# Patient Record
Sex: FEMALE | Race: WHITE | NOT HISPANIC OR LATINO | Employment: FULL TIME | URBAN - NONMETROPOLITAN AREA
[De-identification: names, ages, dates, MRNs, and addresses within clinical notes are randomized per-mention and may not be internally consistent; named-entity substitution may affect disease eponyms.]

---

## 2017-01-18 ENCOUNTER — OFFICE VISIT (OUTPATIENT)
Dept: ORTHOPEDIC SURGERY | Facility: CLINIC | Age: 42
End: 2017-01-18

## 2017-01-18 VITALS — BODY MASS INDEX: 30.92 KG/M2 | WEIGHT: 204 LBS | HEIGHT: 68 IN

## 2017-01-18 DIAGNOSIS — M70.61 TROCHANTERIC BURSITIS OF RIGHT HIP: ICD-10-CM

## 2017-01-18 DIAGNOSIS — M25.551 PAIN OF RIGHT HIP JOINT: Primary | ICD-10-CM

## 2017-01-18 PROCEDURE — 99203 OFFICE O/P NEW LOW 30 MIN: CPT | Performed by: NURSE PRACTITIONER

## 2017-01-18 PROCEDURE — 73502 X-RAY EXAM HIP UNI 2-3 VIEWS: CPT | Performed by: NURSE PRACTITIONER

## 2017-01-18 PROCEDURE — 20610 DRAIN/INJ JOINT/BURSA W/O US: CPT | Performed by: NURSE PRACTITIONER

## 2017-01-18 RX ORDER — LIDOCAINE HYDROCHLORIDE 20 MG/ML
2 INJECTION, SOLUTION INFILTRATION; PERINEURAL
Status: COMPLETED | OUTPATIENT
Start: 2017-01-18 | End: 2017-01-18

## 2017-01-18 RX ORDER — NORGESTIMATE AND ETHINYL ESTRADIOL 7DAYSX3 28
KIT ORAL
COMMUNITY
Start: 2015-09-28 | End: 2017-04-18

## 2017-01-18 RX ORDER — TRIAMCINOLONE ACETONIDE 40 MG/ML
40 INJECTION, SUSPENSION INTRA-ARTICULAR; INTRAMUSCULAR
Status: COMPLETED | OUTPATIENT
Start: 2017-01-18 | End: 2017-01-18

## 2017-01-18 RX ADMIN — LIDOCAINE HYDROCHLORIDE 2 ML: 20 INJECTION, SOLUTION INFILTRATION; PERINEURAL at 13:11

## 2017-01-18 RX ADMIN — TRIAMCINOLONE ACETONIDE 40 MG: 40 INJECTION, SUSPENSION INTRA-ARTICULAR; INTRAMUSCULAR at 13:11

## 2017-01-18 NOTE — PROGRESS NOTES
"Deisi Dorman is a 41 y.o. female   Primary provider:  Janette Johnson MD       Chief Complaint   Patient presents with   • Right Hip - Establish Care     Xray done today in office.        HISTORY OF PRESENT ILLNESS:    Hip Pain    There was no injury mechanism. The pain is present in the right hip. The quality of the pain is described as burning and aching. The pain is mild. The pain has been intermittent since onset. She reports no foreign bodies present. The symptoms are aggravated by palpation and weight bearing. She has tried non-weight bearing and rest for the symptoms. The treatment provided mild relief.        CONCURRENT MEDICAL HISTORY:    Past Medical History   Diagnosis Date   • Anemia        No Known Allergies      Current Outpatient Prescriptions:   •  norgestimate-ethinyl estradiol (TRI-LINYAH) 0.18/0.215/0.25 MG-35 MCG per tablet, TAKE 1 TABLET BY MOUTH EVERY DAY, Disp: , Rfl:     History reviewed. No pertinent past surgical history.    Family History   Problem Relation Age of Onset   • Hypertension Mother        Social History     Social History   • Marital status: Single     Spouse name: N/A   • Number of children: N/A   • Years of education: N/A     Occupational History   • Not on file.     Social History Main Topics   • Smoking status: Current Every Day Smoker     Packs/day: 1.00     Types: Cigarettes   • Smokeless tobacco: Not on file   • Alcohol use Yes      Comment: 1-13 per month   • Drug use: No   • Sexual activity: Not on file     Other Topics Concern   • Not on file     Social History Narrative   • No narrative on file        Review of Systems   Constitutional: Positive for fatigue.   Respiratory: Positive for cough.    Musculoskeletal: Positive for arthralgias.   Psychiatric/Behavioral: Positive for sleep disturbance.       PHYSICAL EXAMINATION:       Visit Vitals   • Ht 68\" (172.7 cm)   • Wt 204 lb (92.5 kg)   • BMI 31.02 kg/m2       Physical Exam   Constitutional: She is oriented " to person, place, and time. Vital signs are normal. She appears well-developed and well-nourished. She is cooperative.   HENT:   Head: Normocephalic and atraumatic.   Neck: Trachea normal and phonation normal.   Pulmonary/Chest: Effort normal. No respiratory distress.   Abdominal: Soft. Normal appearance. She exhibits no distension.   Neurological: She is alert and oriented to person, place, and time. GCS eye subscore is 4. GCS verbal subscore is 5. GCS motor subscore is 6.   Skin: Skin is warm, dry and intact.   Psychiatric: She has a normal mood and affect. Her speech is normal and behavior is normal. Judgment and thought content normal. Cognition and memory are normal.   Vitals reviewed.      GAIT:     []  Normal  []  Antalgic    Assistive device: []  None  []  Walker     []  Crutches  []  Cane     []  Wheelchair  []  Stretcher    Right Shoulder Exam     Tenderness   The patient is experiencing no tenderness.        Range of Motion   Active Abduction: normal   Passive Abduction: normal   Extension: normal   Forward Flexion: normal   External Rotation: normal   Internal Rotation 0 degrees: normal   Internal Rotation 90 degrees: normal     Other   Erythema: absent  Scars: absent  Sensation: normal  Pulse: present      Left Shoulder Exam   Left shoulder exam is normal.            Large Joint Arthrocentesis  Date/Time: 1/18/2017 1:11 PM  Consent given by: patient  Site marked: site marked  Timeout: Immediately prior to procedure a time out was called to verify the correct patient, procedure, equipment, support staff and site/side marked as required   Supporting Documentation  Indications: pain   Procedure Details  Location: hip - R greater trochanteric bursa  Preparation: Patient was prepped and draped in the usual sterile fashion  Needle size: 22 G  Approach: lateral  Medications administered: 40 mg triamcinolone acetonide 40 MG/ML; 2 mL lidocaine 2%  Patient tolerance: patient tolerated the procedure well with no  immediate complications                Xr Hip With Or Without Pelvis 2 - 3 View Right    Result Date: 1/18/2017  Impression: Standing AP of the pelvis with views of the right hip reveal no acute bony abnormalities 01/18/17 at 2:07 PM by SAMSON Wyatt           ASSESSMENT:    Diagnoses and all orders for this visit:    Pain of right hip joint  -     XR Hip With or Without Pelvis 2 - 3 View Right    Trochanteric bursitis of right hip  -     Large Joint Arthrocentesis    Other orders  -     norgestimate-ethinyl estradiol (TRI-LINYAH) 0.18/0.215/0.25 MG-35 MCG per tablet; TAKE 1 TABLET BY MOUTH EVERY DAY          PLAN  Injected the trochanteric bursa today and recommended progression of activity as tolerated based on pain, home exercises and follow-up in 4-6 weeks if pain continues.  No Follow-up on file.    SAMSON Wyatt

## 2017-01-18 NOTE — MR AVS SNAPSHOT
"                        Deisiainsley Dorman   1/18/2017 8:20 AM   Office Visit    Dept Phone:  [phone]   Encounter #:  [id number]    Provider:  SAMSON Lutz   Department:  [facility]                Your Full Care Plan              Your Updated Medication List      Notice  As of 1/18/2017  1:31 PM    You have not been prescribed any medications.            We Performed the Following     Large Joint Arthrocentesis     XR Hip With or Without Pelvis 2 - 3 View Right       You Were Diagnosed With        Codes Comments    Pain of right hip joint    -  Primary ICD-10-CM: M25.551  ICD-9-CM: 719.45     Trochanteric bursitis of right hip     ICD-10-CM: M70.61  ICD-9-CM: 726.5       Instructions     None    Patient Instructions History      Upcoming Appointments     Visit Type Date Time Department    NEW PROBLEM 1/18/2017  8:20 AM [facility]      MyChart Signup     Our records indicate that you have declined [facility] signup. If you would like to sign up for [facility] [employer], please email [email or web address] or call [phone] to obtain an activation code.             Other Info from Your Visit           Allergies     No Known Allergies      Reason for Visit     Right Hip - Establish Care Xray done today in office.       Vital Signs     Height Weight Body Mass Index Smoking Status          68\" (172.7 cm) 204 lb (92.5 kg) 31.02 kg/m2 Current Every Day Smoker        Problems and Diagnoses Noted     Anemia    Pain in joint of right hip    -  Primary    Bursitis of hip          Results     XR Hip With or Without Pelvis 2 - 3 View Right               "

## 2017-04-18 ENCOUNTER — OFFICE VISIT (OUTPATIENT)
Dept: ORTHOPEDIC SURGERY | Facility: CLINIC | Age: 42
End: 2017-04-18

## 2017-04-18 VITALS — BODY MASS INDEX: 29.86 KG/M2 | WEIGHT: 197 LBS | HEIGHT: 68 IN

## 2017-04-18 DIAGNOSIS — M25.551 PAIN OF RIGHT HIP JOINT: Primary | ICD-10-CM

## 2017-04-18 DIAGNOSIS — M70.61 TROCHANTERIC BURSITIS OF RIGHT HIP: ICD-10-CM

## 2017-04-18 PROCEDURE — 99213 OFFICE O/P EST LOW 20 MIN: CPT | Performed by: NURSE PRACTITIONER

## 2017-04-18 PROCEDURE — 20610 DRAIN/INJ JOINT/BURSA W/O US: CPT | Performed by: NURSE PRACTITIONER

## 2017-04-18 RX ADMIN — TRIAMCINOLONE ACETONIDE 80 MG: 40 INJECTION, SUSPENSION INTRA-ARTICULAR; INTRAMUSCULAR at 15:42

## 2017-04-18 RX ADMIN — LIDOCAINE HYDROCHLORIDE 2 ML: 10 INJECTION, SOLUTION INFILTRATION; PERINEURAL at 15:42

## 2017-04-18 NOTE — PROGRESS NOTES
"Deisi Dorman is a 42 y.o. female returns for     Chief Complaint   Patient presents with   • Right Hip - Follow-up       HISTORY OF PRESENT ILLNESS: Patient is requesting a steroid injection into the hip today.  Last injection was   1/18/17. That injection improved her pain greatly.     CONCURRENT MEDICAL HISTORY:    Past Medical History:   Diagnosis Date   • Anemia        No Known Allergies      Current Outpatient Prescriptions:   •  loratadine (CLARITIN) 10 MG tablet, Take 1 tablet by mouth Daily., Disp: 30 tablet, Rfl: 0    No past surgical history on file.    ROS  No fevers or chills.  No chest pain or shortness of air.  No GI or  disturbances.    PHYSICAL EXAMINATION:       Ht 68\" (172.7 cm)  Wt 197 lb (89.4 kg)  BMI 29.95 kg/m2    Physical Exam   Constitutional: She is oriented to person, place, and time. Vital signs are normal. She appears well-developed and well-nourished. She is cooperative.   HENT:   Head: Normocephalic and atraumatic.   Neck: Trachea normal and phonation normal.   Pulmonary/Chest: Effort normal. No respiratory distress.   Abdominal: Soft. Normal appearance. She exhibits no distension.   Neurological: She is alert and oriented to person, place, and time. GCS eye subscore is 4. GCS verbal subscore is 5. GCS motor subscore is 6.   Skin: Skin is warm, dry and intact.   Psychiatric: She has a normal mood and affect. Her speech is normal and behavior is normal. Judgment and thought content normal. Cognition and memory are normal.   Vitals reviewed.      GAIT:     [x]  Normal  []  Antalgic    Assistive device: [x]  None  []  Walker     []  Crutches  []  Cane     []  Wheelchair  []  Stretcher    Right Hip Exam     Tenderness   The patient is experiencing tenderness in the greater trochanter.    Range of Motion   Extension: normal   Flexion: normal   Internal Rotation: normal   External Rotation: normal   Abduction: normal   Adduction: normal     Muscle Strength   Abduction: 5/5 "   Adduction: 5/5   Flexion: 5/5     Other   Erythema: absent  Scars: absent  Sensation: normal  Pulse: present      Left Hip Exam   Left hip exam is normal.              No results found.          ASSESSMENT:    Diagnoses and all orders for this visit:    Pain of right hip joint  -     Large Joint Arthrocentesis    Trochanteric bursitis of right hip  -     Large Joint Arthrocentesis        Large Joint Arthrocentesis  Date/Time: 4/18/2017 3:42 PM  Consent given by: patient  Site marked: site marked  Timeout: Immediately prior to procedure a time out was called to verify the correct patient, procedure, equipment, support staff and site/side marked as required   Supporting Documentation  Indications: pain   Procedure Details  Location: hip - R greater trochanteric bursa  Preparation: Patient was prepped and draped in the usual sterile fashion  Needle size: 22 G  Approach: lateral  Medications administered: 80 mg triamcinolone acetonide 40 MG/ML; 2 mL lidocaine 1 %  Patient tolerance: patient tolerated the procedure well with no immediate complications          PLAN  Repeated the bursal injection today and recommended progression of HEP and activity as tolerated based on pain   No Follow-up on file.    Warren Milner, APRN

## 2017-04-23 RX ORDER — TRIAMCINOLONE ACETONIDE 40 MG/ML
80 INJECTION, SUSPENSION INTRA-ARTICULAR; INTRAMUSCULAR
Status: COMPLETED | OUTPATIENT
Start: 2017-04-18 | End: 2017-04-18

## 2017-04-23 RX ORDER — LIDOCAINE HYDROCHLORIDE 10 MG/ML
2 INJECTION, SOLUTION INFILTRATION; PERINEURAL
Status: COMPLETED | OUTPATIENT
Start: 2017-04-18 | End: 2017-04-18

## 2017-08-10 ENCOUNTER — OFFICE VISIT (OUTPATIENT)
Dept: OPHTHALMOLOGY | Facility: CLINIC | Age: 42
End: 2017-08-10

## 2017-08-10 DIAGNOSIS — B30.9 ACUTE VIRAL CONJUNCTIVITIS OF BOTH EYES: Primary | ICD-10-CM

## 2017-08-10 PROCEDURE — 99202 OFFICE O/P NEW SF 15 MIN: CPT | Performed by: OPHTHALMOLOGY

## 2017-08-10 NOTE — PROGRESS NOTES
Subjective   Deisi Dorman is a 42 y.o. female.   Chief Complaint   Patient presents with   • Itchy Eye     HPI     Itchy Eye   Laterality: left eye   Frequency: constantly   Associated symptoms: redness and discharge   Treatments tried: oral antihistamines   Response to treatment: no improvement           Comments   Red, irritation, am matting OU past week, no URI sxs, exposure to niece with pink eye, getting better       Last edited by Luis E Troy MD on 8/10/2017  4:18 PM. (History)          Review of Systems   Eyes: Positive for discharge and redness.       Objective   Base Eye Exam     Visual Acuity (Snellen - Linear)      Right Left   Dist sc 20/30 -1 20/40               Slit Lamp and Fundus Exam     External Exam      Right Left    External Normal Normal      Slit Lamp Exam      Right Left    Lids/Lashes Normal Normal    Conjunctiva/Sclera 1+ Injection, 2+ Follicles 1+ Injection, 2+ Follicles    Cornea Clear Clear    Anterior Chamber Deep and quiet Deep and quiet    Iris Round and reactive Round and reactive    Lens Clear Clear    Vitreous Normal Normal                Assessment/Plan   Diagnoses and all orders for this visit:    Acute viral conjunctivitis of both eyes      Plan:   HWP, art tears, f/u if not improved

## 2017-09-12 ENCOUNTER — OFFICE VISIT (OUTPATIENT)
Dept: ORTHOPEDIC SURGERY | Facility: CLINIC | Age: 42
End: 2017-09-12

## 2017-09-12 VITALS — HEIGHT: 68 IN | WEIGHT: 201 LBS | BODY MASS INDEX: 30.46 KG/M2

## 2017-09-12 DIAGNOSIS — M70.61 TROCHANTERIC BURSITIS OF RIGHT HIP: ICD-10-CM

## 2017-09-12 DIAGNOSIS — M25.551 PAIN OF RIGHT HIP JOINT: Primary | ICD-10-CM

## 2017-09-12 PROCEDURE — 20610 DRAIN/INJ JOINT/BURSA W/O US: CPT | Performed by: NURSE PRACTITIONER

## 2017-09-12 PROCEDURE — 99213 OFFICE O/P EST LOW 20 MIN: CPT | Performed by: NURSE PRACTITIONER

## 2017-09-12 RX ADMIN — LIDOCAINE HYDROCHLORIDE 2 ML: 10 INJECTION, SOLUTION INFILTRATION; PERINEURAL at 15:16

## 2017-09-12 RX ADMIN — TRIAMCINOLONE ACETONIDE 80 MG: 40 INJECTION, SUSPENSION INTRA-ARTICULAR; INTRAMUSCULAR at 15:16

## 2017-09-12 NOTE — PROGRESS NOTES
"Deisi Dorman is a 42 y.o. female returns for     Chief Complaint   Patient presents with   • Right Hip - Follow-up       HISTORY OF PRESENT ILLNESS: patient requesting steroid injection, last injection done on 4/18/2017       CONCURRENT MEDICAL HISTORY:    Past Medical History:   Diagnosis Date   • Anemia        No Known Allergies      Current Outpatient Prescriptions:   •  loratadine (CLARITIN) 10 MG tablet, Take 1 tablet by mouth Daily., Disp: 30 tablet, Rfl: 0    No past surgical history on file.    ROS  No fevers or chills.  No chest pain or shortness of air.  No GI or  disturbances.    PHYSICAL EXAMINATION:       Ht 68\" (172.7 cm)  Wt 201 lb (91.2 kg)  BMI 30.56 kg/m2    Physical Exam   Constitutional: She is oriented to person, place, and time. Vital signs are normal. She appears well-developed and well-nourished. She is cooperative.   HENT:   Head: Normocephalic and atraumatic.   Neck: Trachea normal and phonation normal.   Pulmonary/Chest: Effort normal. No respiratory distress.   Abdominal: Soft. Normal appearance. She exhibits no distension.   Neurological: She is alert and oriented to person, place, and time. GCS eye subscore is 4. GCS verbal subscore is 5. GCS motor subscore is 6.   Skin: Skin is warm, dry and intact.   Psychiatric: She has a normal mood and affect. Her speech is normal and behavior is normal. Judgment and thought content normal. Cognition and memory are normal.   Vitals reviewed.      GAIT:     [x]  Normal  []  Antalgic    Assistive device: [x]  None  []  Walker     []  Crutches  []  Cane     []  Wheelchair  []  Stretcher    Right Hip Exam     Tenderness   The patient is experiencing tenderness in the greater trochanter.    Range of Motion   Extension: normal   Flexion: normal   Internal Rotation: normal   External Rotation: normal   Abduction: normal   Adduction: normal     Muscle Strength   Abduction: 5/5   Adduction: 5/5   Flexion: 5/5     Other   Erythema: absent  Scars: " absent  Sensation: normal  Pulse: present      Left Hip Exam   Left hip exam is normal.                Large Joint Arthrocentesis  Date/Time: 9/12/2017 3:16 PM  Consent given by: patient  Site marked: site marked  Timeout: Immediately prior to procedure a time out was called to verify the correct patient, procedure, equipment, support staff and site/side marked as required   Supporting Documentation  Indications: pain   Procedure Details  Location: hip - R greater trochanteric bursa  Preparation: Patient was prepped and draped in the usual sterile fashion  Needle size: 22 G  Approach: lateral  Medications administered: 80 mg triamcinolone acetonide 40 MG/ML; 2 mL lidocaine 1 %  Patient tolerance: patient tolerated the procedure well with no immediate complications                ASSESSMENT:    Diagnoses and all orders for this visit:    Pain of right hip joint  -     Large Joint Arthrocentesis    Trochanteric bursitis of right hip  -     Large Joint Arthrocentesis          PLAN  Repeated the trochanteric bursa injection today and recommended progression range of motion and activity as tolerated and follow-up as needed.  No Follow-up on file.    Warren Milner, APRN

## 2017-09-13 RX ORDER — TRIAMCINOLONE ACETONIDE 40 MG/ML
80 INJECTION, SUSPENSION INTRA-ARTICULAR; INTRAMUSCULAR
Status: COMPLETED | OUTPATIENT
Start: 2017-09-12 | End: 2017-09-12

## 2017-09-13 RX ORDER — LIDOCAINE HYDROCHLORIDE 10 MG/ML
2 INJECTION, SOLUTION INFILTRATION; PERINEURAL
Status: COMPLETED | OUTPATIENT
Start: 2017-09-12 | End: 2017-09-12

## 2017-11-08 ENCOUNTER — OFFICE VISIT (OUTPATIENT)
Dept: FAMILY MEDICINE CLINIC | Facility: CLINIC | Age: 42
End: 2017-11-08

## 2017-11-08 VITALS
TEMPERATURE: 98 F | HEART RATE: 70 BPM | BODY MASS INDEX: 30.23 KG/M2 | HEIGHT: 68 IN | OXYGEN SATURATION: 99 % | WEIGHT: 199.5 LBS | DIASTOLIC BLOOD PRESSURE: 84 MMHG | RESPIRATION RATE: 20 BRPM | SYSTOLIC BLOOD PRESSURE: 120 MMHG

## 2017-11-08 DIAGNOSIS — Z87.42 HISTORY OF ABNORMAL CERVICAL PAP SMEAR: ICD-10-CM

## 2017-11-08 DIAGNOSIS — R53.83 FATIGUE, UNSPECIFIED TYPE: ICD-10-CM

## 2017-11-08 DIAGNOSIS — Z13.220 SCREENING FOR HYPERLIPIDEMIA: ICD-10-CM

## 2017-11-08 DIAGNOSIS — Z12.39 SCREENING FOR BREAST CANCER: ICD-10-CM

## 2017-11-08 DIAGNOSIS — M54.50 ACUTE LEFT-SIDED LOW BACK PAIN WITHOUT SCIATICA: Primary | ICD-10-CM

## 2017-11-08 PROCEDURE — 99213 OFFICE O/P EST LOW 20 MIN: CPT | Performed by: NURSE PRACTITIONER

## 2017-11-08 RX ORDER — CYCLOBENZAPRINE HCL 10 MG
10 TABLET ORAL 3 TIMES DAILY PRN
Qty: 30 TABLET | Refills: 0 | Status: SHIPPED | OUTPATIENT
Start: 2017-11-08 | End: 2017-11-15

## 2017-11-08 NOTE — PROGRESS NOTES
Subjective   Deisi CONKLIN is a 42 y.o. female.  Here to establish primary care. Having left lowe back pain.  Onset this past Monday after twisting and lifting an overnight bag that was full.  Pain is now increasing while sitting but does ease while standing.      Back Pain   This is a new problem. The current episode started in the past 7 days. The problem occurs constantly. The pain is present in the lumbar spine. The quality of the pain is described as stabbing. The pain radiates to the left thigh. The pain is at a severity of 7/10. The pain is moderate. The pain is the same all the time. The symptoms are aggravated by standing. Risk factors include lack of exercise. She has tried NSAIDs for the symptoms. The treatment provided mild relief.        The following portions of the patient's history were reviewed and updated as appropriate: allergies, current medications, past family history, past medical history, past social history, past surgical history and problem list.    Review of Systems   Constitutional: Negative.    Eyes: Negative.    Respiratory: Negative.    Cardiovascular: Negative.    Gastrointestinal: Negative.    Genitourinary: Negative.    Musculoskeletal: Positive for back pain.   Skin: Negative.        Objective   Physical Exam   Constitutional: She is oriented to person, place, and time. She appears well-developed and well-nourished.   HENT:   Head: Normocephalic.   Right Ear: External ear normal.   Left Ear: External ear normal.   Mouth/Throat: Oropharynx is clear and moist.   Eyes: EOM are normal. Pupils are equal, round, and reactive to light.   Neck: Normal range of motion. Neck supple.   Cardiovascular: Normal rate, regular rhythm and normal heart sounds.    Pulmonary/Chest: Effort normal and breath sounds normal.   Abdominal: Soft. Bowel sounds are normal.   Musculoskeletal: Normal range of motion.   Neurological: She is alert and oriented to person, place, and time.   Skin: Skin is warm  and dry.   Psychiatric: She has a normal mood and affect. Her behavior is normal. Judgment and thought content normal.   Nursing note and vitals reviewed.      Assessment/Plan   Problems Addressed this Visit     None      Visit Diagnoses     Acute left-sided low back pain without sciatica    -  Primary    Relevant Medications    cyclobenzaprine (FLEXERIL) 10 MG tablet    diclofenac (VOLTAREN) 50 MG EC tablet    Screening for breast cancer        Relevant Orders    Mammo Screening Digital Tomosynthesis Bilateral With CAD    CBC & Differential    Comprehensive metabolic panel    TSH    Lipid panel    Screening for hyperlipidemia        Fatigue, unspecified type        History of abnormal cervical Pap smear        Relevant Orders    Ambulatory Referral to Obstetrics / Gynecology        Begin Voltaren and Flexeril as prescribed lumbar pain and cautioned on the sedating effects of Flexeril  No heavy lifting greater than 5 pounds    Begin OTC Aspercreme with Lidocaine for lumbar pain as directed  Radiology will call to schedule mammogram for breast cancer screening  Gynecology will enriqueta to schedule appointment for annual exam with pap smear  Complete fasting lab work and will call with results         This document has been electronically signed by SAMSON Chopra on November 8, 2017 5:19 PM

## 2017-11-09 ENCOUNTER — DOCUMENTATION (OUTPATIENT)
Dept: FAMILY MEDICINE CLINIC | Facility: CLINIC | Age: 42
End: 2017-11-09

## 2017-11-15 ENCOUNTER — OFFICE VISIT (OUTPATIENT)
Dept: FAMILY MEDICINE CLINIC | Facility: CLINIC | Age: 42
End: 2017-11-15

## 2017-11-15 VITALS
HEIGHT: 68 IN | DIASTOLIC BLOOD PRESSURE: 80 MMHG | BODY MASS INDEX: 30.02 KG/M2 | HEART RATE: 79 BPM | RESPIRATION RATE: 20 BRPM | WEIGHT: 198.1 LBS | TEMPERATURE: 98.1 F | SYSTOLIC BLOOD PRESSURE: 118 MMHG | OXYGEN SATURATION: 98 %

## 2017-11-15 DIAGNOSIS — R05.9 COUGH: ICD-10-CM

## 2017-11-15 DIAGNOSIS — R06.2 WHEEZING: Primary | ICD-10-CM

## 2017-11-15 DIAGNOSIS — J21.9 ACUTE BRONCHIOLITIS DUE TO UNSPECIFIED ORGANISM: ICD-10-CM

## 2017-11-15 PROCEDURE — 96372 THER/PROPH/DIAG INJ SC/IM: CPT | Performed by: NURSE PRACTITIONER

## 2017-11-15 PROCEDURE — 99213 OFFICE O/P EST LOW 20 MIN: CPT | Performed by: NURSE PRACTITIONER

## 2017-11-15 RX ORDER — METHYLPREDNISOLONE 4 MG/1
TABLET ORAL
Qty: 1 EACH | Refills: 0 | Status: SHIPPED | OUTPATIENT
Start: 2017-11-15 | End: 2018-01-23

## 2017-11-15 RX ORDER — ALBUTEROL SULFATE 90 UG/1
2 AEROSOL, METERED RESPIRATORY (INHALATION) EVERY 4 HOURS PRN
Qty: 18 G | Refills: 11 | Status: SHIPPED | OUTPATIENT
Start: 2017-11-15 | End: 2021-05-11

## 2017-11-15 RX ORDER — BENZONATATE 200 MG/1
200 CAPSULE ORAL 3 TIMES DAILY PRN
Qty: 60 CAPSULE | Refills: 0 | Status: SHIPPED | OUTPATIENT
Start: 2017-11-15 | End: 2018-01-23

## 2017-11-15 RX ORDER — TRIAMCINOLONE ACETONIDE 40 MG/ML
60 INJECTION, SUSPENSION INTRA-ARTICULAR; INTRAMUSCULAR ONCE
Status: COMPLETED | OUTPATIENT
Start: 2017-11-15 | End: 2017-11-15

## 2017-11-15 RX ADMIN — TRIAMCINOLONE ACETONIDE 60 MG: 40 INJECTION, SUSPENSION INTRA-ARTICULAR; INTRAMUSCULAR at 11:28

## 2017-11-15 NOTE — PROGRESS NOTES
Subjective   Deisi CONKLIN is a 42 y.o. female.  Began having cough, sore throat, nasal congestion and sinus pressure.  Onset of symptoms this past Saturday (11/11/2017).  Cough is waking her up at night.  No fever or chills.  Has some cough syrup at home that has Phenergan in it and has been using at night with some relief.    Cough   This is a new problem. The current episode started in the past 7 days. The problem has been gradually worsening. The problem occurs every few minutes. The cough is productive of sputum. Associated symptoms include ear congestion, nasal congestion, postnasal drip and rhinorrhea. The symptoms are aggravated by cold air. Risk factors for lung disease include smoking/tobacco exposure. She has tried prescription cough suppressant for the symptoms. The treatment provided no relief. Her past medical history is significant for bronchitis.        The following portions of the patient's history were reviewed and updated as appropriate: allergies, current medications, past family history, past medical history, past social history, past surgical history and problem list.    Review of Systems   HENT: Positive for congestion, postnasal drip, rhinorrhea, sinus pressure and sneezing.    Eyes: Negative.    Respiratory: Positive for cough.    Cardiovascular: Negative.    Gastrointestinal: Negative.    Genitourinary: Negative.        Objective   Physical Exam   Constitutional: She is oriented to person, place, and time. She appears well-nourished.   HENT:   Head: Normocephalic.   Right Ear: External ear normal. Tympanic membrane is bulging.   Left Ear: External ear normal. Tympanic membrane is bulging.   Nose: Right sinus exhibits frontal sinus tenderness. Left sinus exhibits frontal sinus tenderness.   Mouth/Throat: Mucous membranes are normal. Posterior oropharyngeal erythema present.   Moderate amount of clear drainage noted to posterior pharynx   Eyes: EOM are normal. Pupils are equal, round,  and reactive to light.   Neck: Normal range of motion. Neck supple.   Cardiovascular: Normal rate, regular rhythm and normal heart sounds.    Pulmonary/Chest: Effort normal. She has wheezes in the right upper field, the right lower field, the left upper field and the left lower field. She has rhonchi in the right upper field, the right lower field, the left upper field and the left lower field.   Musculoskeletal: Normal range of motion.   Neurological: She is alert and oriented to person, place, and time.   Skin: Skin is warm and dry.   Psychiatric: She has a normal mood and affect. Her behavior is normal. Judgment and thought content normal.   Nursing note and vitals reviewed.      Assessment/Plan   Problems Addressed this Visit     None      Visit Diagnoses     Wheezing    -  Primary    Relevant Medications    albuterol (PROVENTIL HFA;VENTOLIN HFA) 108 (90 Base) MCG/ACT inhaler    triamcinolone acetonide (KENALOG-40) injection 60 mg (Completed) (Start on 11/15/2017 12:00 PM)    MethylPREDNISolone (MEDROL, RITA,) 4 MG tablet    Cough        Relevant Medications    MethylPREDNISolone (MEDROL, RITA,) 4 MG tablet    benzonatate (TESSALON) 200 MG capsule    Acute bronchiolitis due to unspecified organism        Relevant Medications    albuterol (PROVENTIL HFA;VENTOLIN HFA) 108 (90 Base) MCG/ACT inhaler    MethylPREDNISolone (MEDROL, RITA,) 4 MG tablet    benzonatate (TESSALON) 200 MG capsule        Acute Bronchitis with Wheezing:  Begin prescription for albuterol inhaler 1-2 puffs every 4 hours when necessary for shortness of air wheezing  Kenalog 60 mg IM given in office  Begin Medrol Dosepak as prescribed  Schedule follow-up appointment in 48-72 hours if no improvement or worsening of symptoms    Cough:  Begin prescription for Tessalon Perles to be taken 3 times a day when necessary for cough  Increase  fluids to help thin secretions  Recommended to take over-the-counter Muccinex according package directions          This document has been electronically signed by SAMSON Chopra on November 15, 2017 11:29 AM

## 2017-12-14 ENCOUNTER — LAB (OUTPATIENT)
Dept: LAB | Facility: HOSPITAL | Age: 42
End: 2017-12-14

## 2017-12-14 ENCOUNTER — TELEPHONE (OUTPATIENT)
Dept: FAMILY MEDICINE CLINIC | Facility: CLINIC | Age: 42
End: 2017-12-14

## 2017-12-14 DIAGNOSIS — Z12.39 SCREENING FOR BREAST CANCER: ICD-10-CM

## 2017-12-14 LAB
ALBUMIN SERPL-MCNC: 3.8 G/DL (ref 3.4–4.8)
ALBUMIN/GLOB SERPL: 1.4 G/DL (ref 1.1–1.8)
ALP SERPL-CCNC: 49 U/L (ref 38–126)
ALT SERPL W P-5'-P-CCNC: 23 U/L (ref 9–52)
ANION GAP SERPL CALCULATED.3IONS-SCNC: 7 MMOL/L (ref 5–15)
ARTICHOKE IGE QN: 76 MG/DL (ref 1–129)
AST SERPL-CCNC: 31 U/L (ref 14–36)
BASOPHILS # BLD AUTO: 0.02 10*3/MM3 (ref 0–0.2)
BASOPHILS NFR BLD AUTO: 0.2 % (ref 0–2)
BILIRUB SERPL-MCNC: 0.7 MG/DL (ref 0.2–1.3)
BUN BLD-MCNC: 17 MG/DL (ref 7–21)
BUN/CREAT SERPL: 21 (ref 7–25)
CALCIUM SPEC-SCNC: 9.4 MG/DL (ref 8.4–10.2)
CHLORIDE SERPL-SCNC: 102 MMOL/L (ref 95–110)
CHOLEST SERPL-MCNC: 153 MG/DL (ref 0–199)
CO2 SERPL-SCNC: 28 MMOL/L (ref 22–31)
CREAT BLD-MCNC: 0.81 MG/DL (ref 0.5–1)
DEPRECATED RDW RBC AUTO: 45.5 FL (ref 36.4–46.3)
EOSINOPHIL # BLD AUTO: 0.14 10*3/MM3 (ref 0–0.7)
EOSINOPHIL NFR BLD AUTO: 1.4 % (ref 0–7)
ERYTHROCYTE [DISTWIDTH] IN BLOOD BY AUTOMATED COUNT: 12.4 % (ref 11.5–14.5)
GFR SERPL CREATININE-BSD FRML MDRD: 77 ML/MIN/1.73
GLOBULIN UR ELPH-MCNC: 2.8 GM/DL (ref 2.3–3.5)
GLUCOSE BLD-MCNC: 81 MG/DL (ref 60–100)
HCT VFR BLD AUTO: 40.9 % (ref 35–45)
HDLC SERPL-MCNC: 61 MG/DL (ref 60–200)
HGB BLD-MCNC: 14 G/DL (ref 12–15.5)
IMM GRANULOCYTES # BLD: 0.04 10*3/MM3 (ref 0–0.02)
IMM GRANULOCYTES NFR BLD: 0.4 % (ref 0–0.5)
LDLC/HDLC SERPL: 1.3 {RATIO} (ref 0–3.22)
LYMPHOCYTES # BLD AUTO: 2.66 10*3/MM3 (ref 0.6–4.2)
LYMPHOCYTES NFR BLD AUTO: 26.9 % (ref 10–50)
MCH RBC QN AUTO: 34.1 PG (ref 26.5–34)
MCHC RBC AUTO-ENTMCNC: 34.2 G/DL (ref 31.4–36)
MCV RBC AUTO: 99.5 FL (ref 80–98)
MONOCYTES # BLD AUTO: 0.67 10*3/MM3 (ref 0–0.9)
MONOCYTES NFR BLD AUTO: 6.8 % (ref 0–12)
NEUTROPHILS # BLD AUTO: 6.36 10*3/MM3 (ref 2–8.6)
NEUTROPHILS NFR BLD AUTO: 64.3 % (ref 37–80)
PLATELET # BLD AUTO: 219 10*3/MM3 (ref 150–450)
PMV BLD AUTO: 10.4 FL (ref 8–12)
POTASSIUM BLD-SCNC: 4.3 MMOL/L (ref 3.5–5.1)
PROT SERPL-MCNC: 6.6 G/DL (ref 6.3–8.6)
RBC # BLD AUTO: 4.11 10*6/MM3 (ref 3.77–5.16)
SODIUM BLD-SCNC: 137 MMOL/L (ref 137–145)
TRIGL SERPL-MCNC: 62 MG/DL (ref 20–199)
TSH SERPL DL<=0.05 MIU/L-ACNC: 1.86 MIU/ML (ref 0.46–4.68)
WBC NRBC COR # BLD: 9.89 10*3/MM3 (ref 3.2–9.8)

## 2017-12-14 PROCEDURE — 80061 LIPID PANEL: CPT

## 2017-12-14 PROCEDURE — 85025 COMPLETE CBC W/AUTO DIFF WBC: CPT

## 2017-12-14 PROCEDURE — 84443 ASSAY THYROID STIM HORMONE: CPT

## 2017-12-14 PROCEDURE — 36415 COLL VENOUS BLD VENIPUNCTURE: CPT

## 2017-12-14 PROCEDURE — 80053 COMPREHEN METABOLIC PANEL: CPT

## 2017-12-14 NOTE — TELEPHONE ENCOUNTER
Per SAMSON Sanders Kimberly Smith was called regarding recent labs. A message was left to return call to our office.

## 2017-12-14 NOTE — TELEPHONE ENCOUNTER
Deisi Cox returned call regarding recent lab results. Lab results given.  Continue current meds and follow up as planned or sooner if any problems.

## 2018-01-17 ENCOUNTER — OFFICE VISIT (OUTPATIENT)
Dept: OBSTETRICS AND GYNECOLOGY | Facility: CLINIC | Age: 43
End: 2018-01-17

## 2018-01-17 VITALS
BODY MASS INDEX: 30.62 KG/M2 | HEIGHT: 68 IN | DIASTOLIC BLOOD PRESSURE: 89 MMHG | WEIGHT: 202 LBS | HEART RATE: 87 BPM | SYSTOLIC BLOOD PRESSURE: 116 MMHG

## 2018-01-17 DIAGNOSIS — Z01.419 ENCOUNTER FOR GYNECOLOGICAL EXAMINATION WITHOUT ABNORMAL FINDING: Primary | ICD-10-CM

## 2018-01-17 DIAGNOSIS — Z30.011 ENCOUNTER FOR INITIAL PRESCRIPTION OF CONTRACEPTIVE PILLS: ICD-10-CM

## 2018-01-17 DIAGNOSIS — Z12.31 ENCOUNTER FOR SCREENING MAMMOGRAM FOR BREAST CANCER: ICD-10-CM

## 2018-01-17 DIAGNOSIS — N92.6 IRREGULAR PERIODS: ICD-10-CM

## 2018-01-17 PROCEDURE — 88141 CYTOPATH C/V INTERPRET: CPT | Performed by: PATHOLOGY

## 2018-01-17 PROCEDURE — 88142 CYTOPATH C/V THIN LAYER: CPT | Performed by: NURSE PRACTITIONER

## 2018-01-17 PROCEDURE — 99386 PREV VISIT NEW AGE 40-64: CPT | Performed by: NURSE PRACTITIONER

## 2018-01-17 PROCEDURE — 87624 HPV HI-RISK TYP POOLED RSLT: CPT | Performed by: NURSE PRACTITIONER

## 2018-01-17 RX ORDER — ACETAMINOPHEN AND CODEINE PHOSPHATE 120; 12 MG/5ML; MG/5ML
1 SOLUTION ORAL DAILY
Qty: 84 TABLET | Refills: 4 | Status: SHIPPED | OUTPATIENT
Start: 2018-01-17 | End: 2018-06-15

## 2018-01-17 NOTE — PROGRESS NOTES
Subjective   Deisi CONKLIN is a 42 y.o. G0 here for GYN annual exam. Stopped Depo injections about 4 months ago and has been having 2 periods per month since then. Was on Depo for appx 16 years.    HPI Comments: LMP- 1/16/18  Last pap- 8/27/14 ([facility]); hx of abnormal but repeat was normal  Last mammo- never    Gynecologic Exam   The patient's pertinent negatives include no genital itching, genital lesions, genital odor, genital rash, missed menses, pelvic pain, vaginal bleeding or vaginal discharge. Pertinent negatives include no abdominal pain, constipation, diarrhea, dysuria, headaches, nausea, rash, urgency or vomiting. She is sexually active. No, her partner does not have an STD. She uses vasectomy for contraception. Her menstrual history has been irregular.       The following portions of the patient's history were reviewed and updated as appropriate: allergies, current medications, past family history, past medical history, past social history, past surgical history and problem list.    Review of Systems   Constitutional: Negative for activity change, appetite change, fatigue and unexpected weight change.   HENT: Negative for congestion and trouble swallowing.    Respiratory: Negative for chest tightness and shortness of breath.    Cardiovascular: Negative for chest pain, palpitations and leg swelling.   Gastrointestinal: Negative for abdominal distention, abdominal pain, blood in stool, constipation, diarrhea, nausea and vomiting.   Endocrine: Negative for cold intolerance, heat intolerance, polydipsia, polyphagia and polyuria.   Genitourinary: Positive for menstrual problem. Negative for difficulty urinating, dyspareunia, dysuria, genital sores, missed menses, pelvic pain, urgency, vaginal bleeding, vaginal discharge and vaginal pain.   Musculoskeletal: Negative for gait problem and myalgias.   Skin: Negative for color change, pallor and rash.   Neurological: Negative for dizziness, weakness,  light-headedness and headaches.   Hematological: Negative for adenopathy.   Psychiatric/Behavioral: Negative for agitation, confusion, dysphoric mood, self-injury and suicidal ideas. The patient is not nervous/anxious.        Objective   Physical Exam   Constitutional: She is oriented to person, place, and time. She appears well-developed and well-nourished.   Neck: No thyromegaly present.   Cardiovascular: Normal rate, regular rhythm, normal heart sounds and intact distal pulses.    Pulmonary/Chest: Effort normal and breath sounds normal. Right breast exhibits no inverted nipple, no mass, no nipple discharge, no skin change and no tenderness. Left breast exhibits no inverted nipple, no mass, no nipple discharge, no skin change and no tenderness. Breasts are symmetrical.   Abdominal: Soft. Bowel sounds are normal. She exhibits no distension. There is no tenderness.   Genitourinary: Vagina normal and uterus normal. No breast discharge or bleeding. There is no rash, tenderness, lesion or injury on the right labia. There is no rash, tenderness, lesion or injury on the left labia. Cervix exhibits no motion tenderness, no discharge and no friability. Right adnexum displays no mass, no tenderness and no fullness. Left adnexum displays no mass, no tenderness and no fullness.   Genitourinary Comments: Pap smear obtained.   Lymphadenopathy:     She has no axillary adenopathy.        Right: No inguinal adenopathy present.        Left: No inguinal adenopathy present.   Neurological: She is alert and oriented to person, place, and time.   Skin: Skin is warm, dry and intact.   Psychiatric: She has a normal mood and affect. Her speech is normal and behavior is normal.   Nursing note and vitals reviewed.        Assessment/Plan   Deisi was seen today for gynecologic exam.    Diagnoses and all orders for this visit:    Encounter for gynecological examination without abnormal finding  -     Liquid-based Pap Smear, Screening -  ThinPrep Vial, Cervix    Encounter for screening mammogram for breast cancer  -     Mammo Screening Digital Tomosynthesis Bilateral With CAD; Future    Irregular periods    Encounter for initial prescription of contraceptive pills    Other orders  -     norethindrone (MICRONOR) 0.35 MG tablet; Take 1 tablet by mouth Daily.    Start on micronor to help with menses. If not improving in appx 3 months we discussed placing a Nexplanon. She was given written information today and will f/u in 3 months if not going well or in 1 year for refills.

## 2018-01-18 ENCOUNTER — TELEPHONE (OUTPATIENT)
Dept: FAMILY MEDICINE CLINIC | Facility: CLINIC | Age: 43
End: 2018-01-18

## 2018-01-18 NOTE — TELEPHONE ENCOUNTER
Per SAMSON Sanders Kimberly Sizemore was called with recent mammogram results.  Continue current med's and follow up as planned or sooner if any problems.

## 2018-01-22 LAB
LAB AP CASE REPORT: NORMAL
LAB AP GYN ADDITIONAL INFORMATION: NORMAL
LAB AP GYN OTHER FINDINGS: NORMAL
Lab: NORMAL
PATH INTERP SPEC-IMP: NORMAL
STAT OF ADQ CVX/VAG CYTO-IMP: NORMAL

## 2018-01-23 ENCOUNTER — OFFICE VISIT (OUTPATIENT)
Dept: FAMILY MEDICINE CLINIC | Facility: CLINIC | Age: 43
End: 2018-01-23

## 2018-01-23 VITALS
OXYGEN SATURATION: 99 % | SYSTOLIC BLOOD PRESSURE: 120 MMHG | WEIGHT: 202.8 LBS | HEIGHT: 68 IN | BODY MASS INDEX: 30.74 KG/M2 | RESPIRATION RATE: 18 BRPM | DIASTOLIC BLOOD PRESSURE: 84 MMHG | HEART RATE: 87 BPM | TEMPERATURE: 98 F

## 2018-01-23 DIAGNOSIS — J01.00 ACUTE NON-RECURRENT MAXILLARY SINUSITIS: Primary | ICD-10-CM

## 2018-01-23 DIAGNOSIS — R52 BODY ACHES: ICD-10-CM

## 2018-01-23 DIAGNOSIS — R05.9 COUGH: ICD-10-CM

## 2018-01-23 DIAGNOSIS — J02.9 SORE THROAT: ICD-10-CM

## 2018-01-23 LAB
EXPIRATION DATE: NORMAL
EXPIRATION DATE: NORMAL
FLUAV AG NPH QL: NEGATIVE
FLUBV AG NPH QL: NEGATIVE
INTERNAL CONTROL: NORMAL
INTERNAL CONTROL: NORMAL
Lab: NORMAL
Lab: NORMAL
S PYO AG THROAT QL: NEGATIVE

## 2018-01-23 PROCEDURE — 87804 INFLUENZA ASSAY W/OPTIC: CPT | Performed by: NURSE PRACTITIONER

## 2018-01-23 PROCEDURE — 87880 STREP A ASSAY W/OPTIC: CPT | Performed by: NURSE PRACTITIONER

## 2018-01-23 PROCEDURE — 99214 OFFICE O/P EST MOD 30 MIN: CPT | Performed by: NURSE PRACTITIONER

## 2018-01-23 PROCEDURE — 96372 THER/PROPH/DIAG INJ SC/IM: CPT | Performed by: NURSE PRACTITIONER

## 2018-01-23 RX ORDER — AMOXICILLIN AND CLAVULANATE POTASSIUM 875; 125 MG/1; MG/1
1 TABLET, FILM COATED ORAL EVERY 12 HOURS SCHEDULED
Qty: 20 TABLET | Refills: 0 | Status: SHIPPED | OUTPATIENT
Start: 2018-01-23 | End: 2018-06-15

## 2018-01-23 RX ORDER — FLUTICASONE PROPIONATE 50 MCG
2 SPRAY, SUSPENSION (ML) NASAL DAILY
Qty: 1 BOTTLE | Refills: 0 | Status: SHIPPED | OUTPATIENT
Start: 2018-01-23 | End: 2018-06-15

## 2018-01-23 RX ORDER — TRIAMCINOLONE ACETONIDE 40 MG/ML
60 INJECTION, SUSPENSION INTRA-ARTICULAR; INTRAMUSCULAR ONCE
Status: COMPLETED | OUTPATIENT
Start: 2018-01-23 | End: 2018-01-23

## 2018-01-23 RX ORDER — DEXTROMETHORPHAN HYDROBROMIDE AND PROMETHAZINE HYDROCHLORIDE 15; 6.25 MG/5ML; MG/5ML
5 SYRUP ORAL 4 TIMES DAILY PRN
Qty: 240 ML | Refills: 0 | Status: SHIPPED | OUTPATIENT
Start: 2018-01-23 | End: 2018-06-15

## 2018-01-23 RX ADMIN — TRIAMCINOLONE ACETONIDE 60 MG: 40 INJECTION, SUSPENSION INTRA-ARTICULAR; INTRAMUSCULAR at 09:37

## 2018-01-24 LAB — HPV I/H RISK 4 DNA CVX QL PROBE+SIG AMP: POSITIVE

## 2018-01-25 ENCOUNTER — TELEPHONE (OUTPATIENT)
Dept: OBSTETRICS AND GYNECOLOGY | Facility: CLINIC | Age: 43
End: 2018-01-25

## 2018-01-25 NOTE — TELEPHONE ENCOUNTER
----- Message from SAMSON Gibson sent at 1/25/2018 10:16 AM CST -----  Normal pap with positive HPV. Needs repeat co-testing in 1 year. Can send flagyl if symptomatic for BV

## 2018-04-09 DIAGNOSIS — B00.1 FEVER BLISTER: Primary | ICD-10-CM

## 2018-04-09 RX ORDER — FAMCICLOVIR 250 MG/1
250 TABLET ORAL 3 TIMES DAILY
Qty: 21 TABLET | Refills: 0 | Status: SHIPPED | OUTPATIENT
Start: 2018-04-09 | End: 2018-06-15

## 2018-06-15 ENCOUNTER — OFFICE VISIT (OUTPATIENT)
Dept: ORTHOPEDIC SURGERY | Facility: CLINIC | Age: 43
End: 2018-06-15

## 2018-06-15 VITALS — HEIGHT: 68 IN | WEIGHT: 197 LBS | BODY MASS INDEX: 29.86 KG/M2

## 2018-06-15 DIAGNOSIS — M25.521 RIGHT ELBOW PAIN: Primary | ICD-10-CM
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
## 2018-06-15 PROCEDURE — 99214 OFFICE O/P EST MOD 30 MIN: CPT | Performed by: NURSE PRACTITIONER

## 2018-06-15 RX ORDER — MELOXICAM 15 MG/1
15 TABLET ORAL DAILY
Qty: 30 TABLET | Refills: 1 | Status: SHIPPED | OUTPATIENT
Start: 2018-06-15 | End: 2018-07-15

## 2018-06-15 NOTE — PROGRESS NOTES
Deisi CONKLIN is a 43 y.o. female   Primary provider:  SAMSON Chopra       Chief Complaint   Patient presents with   • Right Elbow - Pain       HISTORY OF PRESENT ILLNESS:     43-year-old female patient presents to office for evaluation of right elbow pain/injury. Initial injury occurred about 3 weeks ago when she sustained a fall on her boat, striking her right elbow. She continues to have pain in the elbow when any pressure is applied or she leans on the elbow. No swelling or bruising reported. Pain is described as intermittent and moderate in severity. Pain is described as stabbing in nature. Pain worsens with palpation and pressure against the posterior elbow. Pain improves with rest. She has been taking Ibuprofen also with minimal improvement.       Pain   This is a new problem. The current episode started 1 to 4 weeks ago (About 3 weeks ago). The problem occurs intermittently. The problem has been unchanged. Pertinent negatives include no joint swelling or weakness. Associated symptoms comments: Stabbing. Exacerbated by: pressure to the posterior elbow. She has tried rest and NSAIDs for the symptoms. The treatment provided mild relief.        CONCURRENT MEDICAL HISTORY:    Past Medical History:   Diagnosis Date   • Allergic    • Anemia    • Asthma    • History of cervical cancer        No Known Allergies      Current Outpatient Prescriptions:   •  albuterol (PROVENTIL HFA;VENTOLIN HFA) 108 (90 Base) MCG/ACT inhaler, Inhale 2 puffs Every 4 (Four) Hours As Needed for Wheezing or Shortness of Air., Disp: 18 g, Rfl: 11  •  meloxicam (MOBIC) 15 MG tablet, Take 1 tablet by mouth Daily for 30 days., Disp: 30 tablet, Rfl: 1    Past Surgical History:   Procedure Laterality Date   • CERVIX LESION DESTRUCTION         Family History   Problem Relation Age of Onset   • Hypertension Mother    • Cancer Other         Social History     Social History   • Marital status: Single     Spouse name: N/A   • Number of  "children: N/A   • Years of education: N/A     Occupational History   • Not on file.     Social History Main Topics   • Smoking status: Former Smoker     Packs/day: 0.00   • Smokeless tobacco: Never Used   • Alcohol use Yes      Comment: socially   • Drug use: No   • Sexual activity: Yes     Partners: Male     Birth control/ protection: None     Other Topics Concern   • Not on file     Social History Narrative   • No narrative on file        Review of Systems   Constitutional: Negative.    HENT: Negative.    Eyes: Negative.    Respiratory: Negative.    Cardiovascular: Negative.    Gastrointestinal: Negative.    Endocrine: Negative.    Genitourinary: Negative.    Musculoskeletal: Negative for joint swelling.        Right elbow pain.    Skin: Negative.    Allergic/Immunologic: Negative.    Neurological: Negative.  Negative for weakness.   Hematological: Negative.    Psychiatric/Behavioral: Negative.        PHYSICAL EXAMINATION:       Ht 172.7 cm (68\")   Wt 89.4 kg (197 lb)   BMI 29.95 kg/m²     Physical Exam   Constitutional: She is oriented to person, place, and time. Vital signs are normal. She appears well-developed and well-nourished. She is active and cooperative. She does not appear ill. No distress.   HENT:   Head: Normocephalic.   Pulmonary/Chest: Effort normal. No respiratory distress.   Abdominal: Soft. She exhibits no distension.   Musculoskeletal: She exhibits tenderness (Right elbow). She exhibits no edema or deformity.   Neurological: She is alert and oriented to person, place, and time. GCS eye subscore is 4. GCS verbal subscore is 5. GCS motor subscore is 6.   Skin: Skin is warm, dry and intact. Capillary refill takes less than 2 seconds. No erythema.   Psychiatric: She has a normal mood and affect. Her speech is normal and behavior is normal. Judgment and thought content normal. Cognition and memory are normal.   Vitals reviewed.      GAIT:     [x]  Normal  []  Antalgic    Assistive device: [x]  " None  []  Walker     []  Crutches  []  Cane     []  Wheelchair  []  Stretcher    Right Elbow Exam     Tenderness   The patient is experiencing tenderness in the olecranon fossa.     Range of Motion   The patient has normal right elbow ROM.    Muscle Strength   The patient has normal right elbow strength.    Tests Varus: negative  Valgus: negative        Other   Erythema: absent  Sensation: normal  Pulse: present    Comments:  Pain to localized are in posterior elbow/olecranon fossa. No pain with range of motion. No deformity. No swelling. No ecchymosis. Skin is intact.       Left Elbow Exam     Tenderness   The patient is experiencing no tenderness.         Range of Motion   The patient has normal left elbow ROM.    Muscle Strength   The patient has normal left elbow strength.    Tests Varus: negative  Valgus: negative        Other   Erythema: absent  Sensation: normal  Pulse: present            Xr Elbow 3+ View Right    Result Date: 6/15/2018  Narrative: AP, lateral and axial views of the right elbow reveal no evidence of acute fracture or dislocation.  No evidence of joint effusion noted.  Soft tissues are unremarkable.  Anatomic alignment is normal.  Joint spaces are well-maintained.  No acute radiologic abnormalities are noted at this time.  No comparison images are available for review.06/15/18 at 3:01 PM by SAMSON Escobar     ASSESSMENT:    Diagnoses and all orders for this visit:    Right elbow pain    Fall with injury, initial encounter    Other orders  -     meloxicam (MOBIC) 15 MG tablet; Take 1 tablet by mouth Daily for 30 days.    PLAN    X-rays of right elbow reviewed. No fracture identified. Right elbow pain/tenderness has been ongoing since the injury about 3 weeks ago. The pain/tenderness is very localized to the posterior elbow. No abnormal signs noted on physical exam other than the tenderness. Discussed possibility of bone contusion. Recommend to continue consistent dosing of oral NSAIDs.  Ibuprofen is discontinued and Meloxicam prescribed. Recommend ice therapy to the right elbow intermittently 3 to 4 times daily for 20 minutes at a time to minimize pain. Activity as tolerated. Follow up in 4 weeks for recheck as needed for any new, worsening or persistent symptoms.     Return in about 4 weeks (around 7/13/2018), or if symptoms worsen or fail to improve.      This document has been electronically signed by SAMSON Escobar on June 17, 2018 9:07 PM      SAMSON Escobar
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
## 2020-04-28 ENCOUNTER — TELEPHONE (OUTPATIENT)
Dept: FAMILY MEDICINE CLINIC | Facility: CLINIC | Age: 45
End: 2020-04-28

## 2020-04-28 ENCOUNTER — TELEMEDICINE (OUTPATIENT)
Dept: FAMILY MEDICINE CLINIC | Facility: CLINIC | Age: 45
End: 2020-04-28

## 2020-04-28 DIAGNOSIS — R21 RASH AND NONSPECIFIC SKIN ERUPTION: Primary | ICD-10-CM

## 2020-04-28 PROCEDURE — 99212 OFFICE O/P EST SF 10 MIN: CPT | Performed by: NURSE PRACTITIONER

## 2020-04-28 RX ORDER — METHYLPREDNISOLONE 4 MG/1
TABLET ORAL
Qty: 1 EACH | Refills: 0 | Status: SHIPPED | OUTPATIENT
Start: 2020-04-28 | End: 2021-05-11

## 2020-04-28 NOTE — PROGRESS NOTES
"Subjective   Deisi CONKLIN is a 45 y.o. female.  [place]  Was walking around in flip-flops for most of the weekend.  \"I thought I got into some poison ivy but have tried hydrocortisone and calamine and nothing is helping.  \"    Rash   This is a new problem. The current episode started in the past 7 days. The problem is unchanged. The affected locations include the left foot. The rash is characterized by blistering, dryness, redness and itchiness. She was exposed to plant contact. Pertinent negatives include no cough, fatigue, fever or shortness of breath. Past treatments include topical steroids. The treatment provided no relief.        The following portions of the patient's history were reviewed and updated as appropriate:     Current Outpatient Medications   Medication Sig Dispense Refill   • albuterol (PROVENTIL HFA;VENTOLIN HFA) 108 (90 Base) MCG/ACT inhaler Inhale 2 puffs Every 4 (Four) Hours As Needed for Wheezing or Shortness of Air. 18 g 11   • methylPREDNISolone (MEDROL, RITA,) 4 MG tablet Take as directed on package instructions. 1 each 0   • triamcinolone (KENALOG) 0.1 % ointment Apply  topically to the appropriate area as directed 2 (Two) Times a Day. 30 g 1     No current facility-administered medications for this visit.      Current Outpatient Medications on File Prior to Visit   Medication Sig   • albuterol (PROVENTIL HFA;VENTOLIN HFA) 108 (90 Base) MCG/ACT inhaler Inhale 2 puffs Every 4 (Four) Hours As Needed for Wheezing or Shortness of Air.     No current facility-administered medications on file prior to visit.      She has No Known Allergies..    Review of Systems   Constitutional: Negative.  Negative for fatigue and fever.   Respiratory: Negative.  Negative for cough and shortness of breath.    Cardiovascular: Negative.    Gastrointestinal: Negative.    Skin: Positive for rash.   Neurological: Negative.        Objective   Physical " Exam   Constitutional: She is oriented to person, place, and time. She appears well-developed and well-nourished.   Pulmonary/Chest: Effort normal.   Neurological: She is alert and oriented to person, place, and time.   Skin:            Assessment/Plan   Problems Addressed this Visit     None      Visit Diagnoses     Rash and nonspecific skin eruption    -  Primary    Relevant Medications    methylPREDNISolone (MEDROL, RITA,) 4 MG tablet    triamcinolone (KENALOG) 0.1 % ointment        New Medications Ordered This Visit   Medications   • methylPREDNISolone (MEDROL, RITA,) 4 MG tablet     Sig: Take as directed on package instructions.     Dispense:  1 each     Refill:  0   • triamcinolone (KENALOG) 0.1 % ointment     Sig: Apply  topically to the appropriate area as directed 2 (Two) Times a Day.     Dispense:  30 g     Refill:  1     1.  Rash and nonspecific skin eruption:  Begin Medrol Dosepak as prescribed  Begin triamcinolone cream as prescribed  Educated on possible side effects of steroidal medications including but not limited to increased risk for weight gain, hirsutism and acne  Encouraged to not wear shoe or sock while in her own home and leave open to air as much as possible  Strongly encouraged cool compresses to help ease itching  May use Benadryl OTC according to package directions  Contact this office in 1 week if no improvement or worsening symptoms    You have chosen to receive care through a telehealth visit.  Do you consent to use a video/audio connection for your medical care today? Yes  Continue on current medications as previously prescribed   This was an audio and video enabled telemedicine encounter.  I spent 12 minutes charting and in video face to face contact with patient. Greater than 50% of this time was spent counseling patient and discussing plan of care.  Return if symptoms worsen or fail to improve, for Next scheduled follow up.        This document has been electronically signed by Emilia  PRAVEEN Vasquez, APRN on April 28, 2020 13:19

## 2020-04-28 NOTE — TELEPHONE ENCOUNTER
Just FYI, patients preferred pharmacy is now [address] [phone] [facility] [phone]  for all future medication requests/refills.

## 2020-04-28 NOTE — TELEPHONE ENCOUNTER
[facility] pharmacy called and had a question regarding Kenalog, their callback number is [phone]

## 2021-05-10 DIAGNOSIS — M25.551 RIGHT HIP PAIN: Primary | ICD-10-CM

## 2021-05-11 ENCOUNTER — OFFICE VISIT (OUTPATIENT)
Dept: ORTHOPEDIC SURGERY | Facility: CLINIC | Age: 46
End: 2021-05-11

## 2021-05-11 VITALS — BODY MASS INDEX: 33.19 KG/M2 | WEIGHT: 219 LBS | HEIGHT: 68 IN

## 2021-05-11 DIAGNOSIS — M25.551 RIGHT HIP PAIN: ICD-10-CM

## 2021-05-11 DIAGNOSIS — M70.61 TROCHANTERIC BURSITIS OF RIGHT HIP: ICD-10-CM

## 2021-05-11 PROCEDURE — 99214 OFFICE O/P EST MOD 30 MIN: CPT | Performed by: NURSE PRACTITIONER

## 2021-05-11 PROCEDURE — 20610 DRAIN/INJ JOINT/BURSA W/O US: CPT | Performed by: NURSE PRACTITIONER

## 2021-05-11 RX ADMIN — LIDOCAINE HYDROCHLORIDE 2 ML: 10 INJECTION, SOLUTION INFILTRATION; PERINEURAL at 14:30

## 2021-05-11 RX ADMIN — TRIAMCINOLONE ACETONIDE 80 MG: 40 INJECTION, SUSPENSION INTRA-ARTICULAR; INTRAMUSCULAR at 14:30

## 2021-05-17 RX ORDER — TRIAMCINOLONE ACETONIDE 40 MG/ML
80 INJECTION, SUSPENSION INTRA-ARTICULAR; INTRAMUSCULAR
Status: COMPLETED | OUTPATIENT
Start: 2021-05-11 | End: 2021-05-11

## 2021-05-17 RX ORDER — LIDOCAINE HYDROCHLORIDE 10 MG/ML
2 INJECTION, SOLUTION INFILTRATION; PERINEURAL
Status: COMPLETED | OUTPATIENT
Start: 2021-05-11 | End: 2021-05-11